# Patient Record
Sex: MALE | Race: WHITE | NOT HISPANIC OR LATINO | Employment: FULL TIME | URBAN - METROPOLITAN AREA
[De-identification: names, ages, dates, MRNs, and addresses within clinical notes are randomized per-mention and may not be internally consistent; named-entity substitution may affect disease eponyms.]

---

## 2019-06-24 ENCOUNTER — OFFICE VISIT (OUTPATIENT)
Dept: ORTHOPEDIC SURGERY | Facility: CLINIC | Age: 39
End: 2019-06-24

## 2019-06-24 VITALS — HEART RATE: 116 BPM | HEIGHT: 70 IN | WEIGHT: 199.08 LBS | BODY MASS INDEX: 28.5 KG/M2 | OXYGEN SATURATION: 99 %

## 2019-06-24 DIAGNOSIS — M25.511 ACUTE PAIN OF RIGHT SHOULDER: Primary | ICD-10-CM

## 2019-06-24 PROCEDURE — 99203 OFFICE O/P NEW LOW 30 MIN: CPT | Performed by: ORTHOPAEDIC SURGERY

## 2019-06-24 NOTE — PROGRESS NOTES
[facility] [place] [facility] Note    Subjective     Chief Complaint   Patient presents with   • Right Shoulder - Pain        HPI  Too Canas is a 39 y.o. male.  He complains of right shoulder pain.  Is had 3 weeks.  Hand injury.  He was working on a sink at home.  Pain is 2 out of 10 and shooting.  He would refer to go ahead and get an MRI so he can know what is wrong rather than try much physical therapy  Past Medical History:   Diagnosis Date   • Asthma       Past Surgical History:   Procedure Laterality Date   • GANGLION CYST EXCISION     • KNEE SURGERY        Family History   Problem Relation Age of Onset   • No Known Problems Mother    • No Known Problems Father      Social History     Socioeconomic History   • Marital status: Unknown     Spouse name: Not on file   • Number of children: Not on file   • Years of education: Not on file   • Highest education level: Not on file   Tobacco Use   • Smoking status: Never Smoker   • Smokeless tobacco: Never Used   Substance and Sexual Activity   • Alcohol use: Yes     Comment: occasional   • Drug use: No   • Sexual activity: Defer      Current Outpatient Medications on File Prior to Visit   Medication Sig Dispense Refill   • azelastine (ASTELIN) 0.1 % nasal spray 2 sprays into the nostril(s) as directed by provider 2 (Two) Times a Day. Use in each nostril as directed     • fluticasone (FLONASE) 50 MCG/ACT nasal spray 2 sprays into the nostril(s) as directed by provider Daily.     • Loratadine-Pseudoephedrine (CLARITIN-D 24 HOUR PO) Take  by mouth.     • montelukast (SINGULAIR) 10 MG tablet Take 10 mg by mouth Every Night.     • [DISCONTINUED] azithromycin (ZITHROMAX) 250 MG tablet Take 2 tablets the first day, then 1 tablet daily for 4 days. 6 tablet 0   • [DISCONTINUED] MethylPREDNISolone (MEDROL, STARR,) 4 MG tablet Take as directed on package instructions. 21 each 0     No current facility-administered medications on file prior to visit.       No Known  "Allergies     The following portions of the patient's history were reviewed and updated as appropriate: allergies, current medications, past family history, past medical history, past social history, past surgical history and problem list.    Review of Systems   Constitutional: Positive for activity change.   HENT: Negative.    Eyes: Negative.    Respiratory: Negative.    Cardiovascular: Negative.    Gastrointestinal: Negative.    Endocrine: Negative.    Genitourinary: Negative.    Musculoskeletal: Positive for arthralgias.   Skin: Negative.    Allergic/Immunologic: Positive for environmental allergies.   Neurological: Negative.    Hematological: Negative.    Psychiatric/Behavioral: Negative.         Objective      Physical Exam  Pulse 116   Ht 177.8 cm (70\")   Wt 90.3 kg (199 lb 1.2 oz)   SpO2 99%   BMI 28.56 kg/m²     Body mass index is 28.56 kg/m².    GENERAL APPEARANCE: awake, alert & oriented x 3, in no acute distress and well developed, well nourished  PSYCH: normal mood and affect  LUNGS:  breathing nonlabored, no wheezing  EYES: sclera anicteric, pupils equal  CARDIOVASCULAR: palpable pulses dorsalis pedis, palpable posterior tibial bilaterally. Capillary refill less than 2 seconds  INTEGUMENTARY: skin intact, no clubbing, cyanosis  NEUROLOGIC:  Normal Sensation and reflexes       Ortho Exam  Musculoskeletal   Upper Extremity   Right Shoulder     Inspection and Palpation:     Tenderness - none    Crepitus - none    Sensation is normal    Examination reveals no ecchymosis.      Strength and Tone:    Supraspinatus,  Infraspinatus - 5/5    Subscapularis - 5/5    Deltoid - 5/5     Range of Motion   Left shoulder:    Internal Rotation: ROM - T7    External Rotation: AROM - 80 degrees    Elevation through flexion: AROM - 180 degrees    Right Shoulder:    Internal Rotation: ROM - T7    External Rotation: AROM - 80 degrees    Elevation through flexion: AROM - 180 degrees     Abduction -180 " degrees     Instability   Right shoulder    Sulcus sign negative    Apprehension test negative    Rei relocation test negative    Jerk test negative   Impingement   Right shoulder    Gaffney impingement test positive    Neer impingement test positive   Functional Testing   Right shoulder    AC crossover adduction test negative    Abdominal compression test negative    Lift-off sign negative    Speed's test negative    Bell's test negative    Horriblower's sign negative       Imaging/Studies  Imaging Results (last 7 days)     Procedure Component Value Units Date/Time    XR Shoulder 2+ View Right [[id number]] Resulted:  06/24/19 1105     Updated:  06/24/19 1106    Narrative:       Right Shoulder X-Ray  Indication: Pain  AP, scapular Y, and axillary lateral views    Findings:  No fracture  No bony lesion  Normal soft tissues  Normal joint spaces    No prior studies were available for comparison.            Assessment/Plan        ICD-10-CM ICD-9-CM   1. Acute pain of right shoulder M25.511 719.41       Orders Placed This Encounter   Procedures   • XR Shoulder 2+ View Right   • FL Contrast Injection CT / MRI   • MRI shoulder right arthrogram      I ordered the MRI of the right shoulder.  I will see him back after the MRI arthrogram he most likely has a labral tear or small cuff tear    Medical Decision Making  Management Options : over-the-counter medicine  Data/Risk: radiology tests and independent visualization of imaging, lab tests, or EMG/NCV    Mak Ley MD  06/24/19  11:18 AM         EMR Dragon/Transcription disclaimer:  Much of this encounter note is an electronic transcription of spoken language to printed text. Electronic transcription of spoken language may permit erroneous, or at times, nonsensical words or phrases to be inadvertently transcribed. Although I have reviewed the note for such errors, some may still exist.

## 2019-07-03 ENCOUNTER — HOSPITAL ENCOUNTER (OUTPATIENT)
Dept: GENERAL RADIOLOGY | Facility: HOSPITAL | Age: 39
Discharge: HOME | End: 2019-07-03

## 2019-07-03 ENCOUNTER — TELEPHONE (OUTPATIENT)
Dept: ORTHOPEDIC SURGERY | Facility: CLINIC | Age: 39
End: 2019-07-03

## 2019-07-03 ENCOUNTER — HOSPITAL ENCOUNTER (OUTPATIENT)
Dept: GENERAL RADIOLOGY | Facility: HOSPITAL | Age: 39
Discharge: HOME | End: 2019-07-03
Admitting: ORTHOPAEDIC SURGERY

## 2019-07-03 ENCOUNTER — APPOINTMENT (OUTPATIENT)
Dept: MRI IMAGING | Facility: HOSPITAL | Age: 39
End: 2019-07-03

## 2019-07-03 DIAGNOSIS — M25.511 ACUTE PAIN OF RIGHT SHOULDER: ICD-10-CM

## 2019-07-03 DIAGNOSIS — M79.5 FOREIGN BODY (FB) IN SOFT TISSUE: ICD-10-CM

## 2019-07-03 PROCEDURE — 70360 X-RAY EXAM OF NECK: CPT
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
## 2019-07-12 ENCOUNTER — OFFICE VISIT (OUTPATIENT)
Dept: ORTHOPEDIC SURGERY | Facility: CLINIC | Age: 39
End: 2019-07-12

## 2019-07-12 VITALS — WEIGHT: 199.08 LBS | HEART RATE: 99 BPM | HEIGHT: 70 IN | BODY MASS INDEX: 28.5 KG/M2 | OXYGEN SATURATION: 98 %

## 2019-07-12 DIAGNOSIS — M75.91 SUPRASPINATUS TENDINITIS, RIGHT: Primary | ICD-10-CM

## 2019-07-12 PROCEDURE — 99213 OFFICE O/P EST LOW 20 MIN: CPT | Performed by: ORTHOPAEDIC SURGERY
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
## 2019-07-22 ENCOUNTER — HOSPITAL ENCOUNTER (OUTPATIENT)
Dept: PHYSICAL THERAPY | Facility: HOSPITAL | Age: 39
Setting detail: THERAPIES SERIES
Discharge: HOME | End: 2019-07-22

## 2019-07-22 DIAGNOSIS — M25.511 ACUTE PAIN OF RIGHT SHOULDER: Primary | ICD-10-CM

## 2019-07-22 PROCEDURE — 97161 PT EVAL LOW COMPLEX 20 MIN: CPT | Performed by: PHYSICAL THERAPIST
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
## 2019-08-01 ENCOUNTER — APPOINTMENT (OUTPATIENT)
Dept: PHYSICAL THERAPY | Facility: HOSPITAL | Age: 39
End: 2019-08-01

## 2019-08-07 ENCOUNTER — APPOINTMENT (OUTPATIENT)
Dept: PHYSICAL THERAPY | Facility: HOSPITAL | Age: 39
End: 2019-08-07

## 2019-09-25 ENCOUNTER — DOCUMENTATION (OUTPATIENT)
Dept: PHYSICAL THERAPY | Facility: CLINIC | Age: 39
End: 2019-09-25

## 2019-09-25 NOTE — PROGRESS NOTES
Discharge Summary  Discharge Summary from Physical Therapy Report      Date of initial PT visit: 7/22/19  Number of Visits: 1     Goals: Not Met - pt did not return to PT after IE so goals not assessed    Discharge Plan: Continue with current home exercise program as instructed    Comments: The pt was evaluated for acute shoulder pain and was provided and HEP. He called and stated his work/travel schedule was too busy for PT at this time and requested d/c.    Date of Discharge 9/25/19        Jarocho Choe, PT  Physical Therapist

## 2020-04-27 ENCOUNTER — TELEMEDICINE (OUTPATIENT)
Dept: SLEEP MEDICINE | Facility: HOSPITAL | Age: 40
End: 2020-04-27

## 2020-04-27 VITALS — WEIGHT: 199 LBS | HEIGHT: 70 IN | BODY MASS INDEX: 28.49 KG/M2

## 2020-04-27 DIAGNOSIS — R06.81 WITNESSED EPISODE OF APNEA: ICD-10-CM

## 2020-04-27 DIAGNOSIS — E66.3 OVERWEIGHT (BMI 25.0-29.9): ICD-10-CM

## 2020-04-27 DIAGNOSIS — R06.83 SNORING: Primary | ICD-10-CM

## 2020-04-27 PROCEDURE — 99204 OFFICE O/P NEW MOD 45 MIN: CPT | Performed by: INTERNAL MEDICINE

## 2020-04-27 NOTE — PROGRESS NOTES
New Sleep Patient Office Visit      Patient Name: Too Canas    Referring Physician: Ryann Villaseñor MD    Chief Complaint:    Chief Complaint   Patient presents with   • Snoring     You have chosen to receive care through a telehealth visit.  Do you consent to use a video/audio connection for your medical care today? Yes      History of Present Illness: Too Canas is a 40 y.o. male who is here today to establish care with Sleep Medicine.    40-year-old male with past medical history of for allergies and asthma presenting for initial sleep medicine evaluation.  Patient states that he has been struggling with snoring and stopping breathing episodes at night.  His wife was present on the underlying video conference and she told me that patient snores very loudly and has stopping breathing episodes multiple times during the night.  He snores in all body positions.  Occasionally he will be seen as flailing and kicking during sleep but not acting out dreams or sleepwalking or REM behavior disorder phenomena.  He does occasionally sleep talk.  Patient states that he occasionally wakes up with dry mouth but no headaches in the morning.  He does have some sleep inertia when he wakes up but later on feels okay.  Does not take any naps during the daytime.  Some fatigue during the daytime with [place] score of 7 out of 24.  He states that he used to be very active and maintaining his weight but he is suffered some knee injuries and he has not been doing any workout and has gained 10 to 15 pounds of weight and that has made his snoring and fatigue symptoms worse.    Patient works as a  from 4 PM to 2 AM at [email or web address].  He generally sleeps well during the daytime.  No naps during the daytime.  He sleeps 6 to 8 hours a night and occasionally will wake up to go to the restroom.  Denies any restless leg symptoms.  Denies any heartburn or reflux symptoms.  Denies any driving  problems or sleep-related accidents.    Patient denies any excessive alcohol use.  No illicit drug use.    Patient does have history of allergies and asthma and allergic medications including Singulair.  Symptoms are under control.    Further sleep history is as below.    [place] Scale: 7/24        Estimated average amount of sleep per night: 7  Number of times  he wakes up at night: 1-2  Difficulty falling back asleep: no  It usually takes 10 minutes to go to sleep.  He feels sleepy upon waking up: tired sometimes  Rotating or night shift work: late night shift    Drowsiness/Sleepiness:  He exhibits the following:  excessive daytime fatigue    Snoring/Breathing:  He exhibits the following:  loud snoring  snores in all sleep positions  awoken with dry mouth  quits breathing at night  trouble breathing through nose during the day    Reflux:  He describes the following:  none    Narcolepsy:  He exhibits the following:  none    RLS/PLMs:  He describes the following:  none    Insomnia:  He describes the following:  none    Parasomnia:  He exhibits the following:  none    Weight:  Weight change in the last year:  gain: 10-15 lbs    Subjective      Review of Systems:   Review of Systems   Constitutional: Positive for fatigue and unexpected weight gain.   HENT: Positive for congestion, sinus pressure and sneezing.    Respiratory: Positive for wheezing.    Cardiovascular: Negative.    Gastrointestinal: Negative.    Endocrine: Negative.    Musculoskeletal: Negative.    Skin: Negative.    Neurological: Negative.    Hematological: Negative.    Psychiatric/Behavioral: The patient is nervous/anxious.    All other systems reviewed and are negative.      Past Medical History:   Past Medical History:   Diagnosis Date   • Asthma        Past Surgical History:   Past Surgical History:   Procedure Laterality Date   • GANGLION CYST EXCISION     • KNEE SURGERY         Family History:   Family History   Problem Relation Age of Onset   •  "No Known Problems Mother    • No Known Problems Father        Social History:   Social History     Socioeconomic History   • Marital status:      Spouse name: Not on file   • Number of children: Not on file   • Years of education: Not on file   • Highest education level: Not on file   Tobacco Use   • Smoking status: Never Smoker   • Smokeless tobacco: Never Used   Substance and Sexual Activity   • Alcohol use: Yes     Comment: occasional   • Drug use: No   • Sexual activity: Defer       Medications:     Current Outpatient Medications:   •  azelastine (ASTELIN) 0.1 % nasal spray, 2 sprays into the nostril(s) as directed by provider 2 (Two) Times a Day. Use in each nostril as directed, Disp: , Rfl:   •  fluticasone (FLONASE) 50 MCG/ACT nasal spray, 2 sprays into the nostril(s) as directed by provider Daily., Disp: , Rfl:   •  Loratadine-Pseudoephedrine (CLARITIN-D 24 HOUR PO), Take  by mouth., Disp: , Rfl:   •  montelukast (SINGULAIR) 10 MG tablet, Take 10 mg by mouth Every Night., Disp: , Rfl:     Allergies:   No Known Allergies    Objective     Physical Exam:  Vital Signs:   Vitals:    04/27/20 1317   Weight: 90.3 kg (199 lb)   Height: 177.8 cm (70\")       Physical Exam   Constitutional: He is oriented to person, place, and time. He appears well-developed and well-nourished. No distress.   HENT:   mallampati 3 airway   Neck:   Doesn't know neck size     Pulmonary/Chest: Effort normal. No respiratory distress.   Musculoskeletal: He exhibits no edema.   Neurological: He is alert and oriented to person, place, and time.   Skin: He is not diaphoretic.   Psychiatric: He has a normal mood and affect. His behavior is normal. Judgment and thought content normal.       Results Review:   HST ordered.     Assessment / Plan      Assessment:   Problem List Items Addressed This Visit     None      Visit Diagnoses     Snoring    -  Primary    Witnessed episode of apnea        Overweight (BMI 25.0-29.9)                Plan: "   1.  Patient with history of snoring, overweight, upper airway abnormalities and episodes of witnessed apnea.  Concerning for underlying sleep disordered breathing.  Discussed at length about pathophysiology of sleep apnea.  Side effects of untreated sleep apnea such as cardiovascular, neurologic and metabolic side effects reviewed.  Discussed the need for sleep study to evaluate the presence and severity of sleep apnea with the patient.  He is amenable to proceeding with any overnight sleep study.  We will try to get it done as home sleep study.    2.  Increasing activity and weight loss counseled strongly and its impact on snoring and sleep apnea diagnosis reviewed with the patient.    3.  Available treatment options for sleep apnea reviewed.  If he does not have sleep apnea but looks like his still bothered with excessive snoring and his wife is concerned then 1 could consider oral appliance therapy for snoring treatment along with weight loss.    We will follow him closely after sleep study to go over the results and discuss further management options.  Patient had no further questions at this time.    Follow Up:   Follow up after HST.    Discussed plan of care in detail with patient today. Patient verbally understands and agrees.        Omid Wilks MD  Pulmonary Critical Care and Sleep Medicine      Please note that portions of this note may have been completed with a voice recognition program. Efforts were made to edit the dictations, but occasionally words are mistranscribed.

## 2020-05-27 ENCOUNTER — HOSPITAL ENCOUNTER (OUTPATIENT)
Dept: SLEEP MEDICINE | Facility: HOSPITAL | Age: 40
Discharge: HOME | End: 2020-05-27
Admitting: INTERNAL MEDICINE

## 2020-05-27 VITALS — WEIGHT: 199 LBS | BODY MASS INDEX: 28.49 KG/M2 | HEIGHT: 70 IN

## 2020-05-27 DIAGNOSIS — R06.81 WITNESSED EPISODE OF APNEA: ICD-10-CM

## 2020-05-27 DIAGNOSIS — R06.83 SNORING: ICD-10-CM

## 2020-05-27 PROCEDURE — 95800 SLP STDY UNATTENDED: CPT | Performed by: INTERNAL MEDICINE

## 2020-05-27 PROCEDURE — 95800 SLP STDY UNATTENDED: CPT

## 2020-06-01 DIAGNOSIS — G47.33 MODERATE OBSTRUCTIVE SLEEP APNEA: Primary | ICD-10-CM

## 2020-06-09 DIAGNOSIS — G47.33 OSA (OBSTRUCTIVE SLEEP APNEA): Primary | ICD-10-CM

## 2020-06-09 NOTE — PROGRESS NOTES
CALLED PATIENT AND ADVISED OF STUDY RESULTS. PATIENT VERBALIZED UNDERSTANDING AND WAS AGREEABLE TO PAP THERAPY. FAXED ORDER TO ELIGIO ABREU 06/09/20 RICK